# Patient Record
(demographics unavailable — no encounter records)

---
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
## 2024-12-02 NOTE — HISTORY OF PRESENT ILLNESS
[FreeTextEntry1] :  76 year old F with PMHx of HLD presents to establish cardiac care.   Reports myalgias with multiple statins. Recently tried rosuvastatin 10 daily and every other day but continued to myalgias. Started Zetia one week ago without concerns In June, stopped drinking alcohol. Previously had 2-4 drinks per day Swims 30 minutes daily, indoor and outdoor biking 45 minutes 2-3 times a week. Able to walk/ or run upstairs. Mild dyspnea on exertion on occasion She reports an episode of chest tightness that lasted for 1 week. The pain was brought on with lying down. Denies onset or worsening of symptoms with exertion She eats a healthy diet, no meat, mostly home cooked   Pt. denies any  orthopnea, PND, palpitations, lightheadedness, syncope or lower extremity edema. ================================ Previous workup: Labs (11/25/2024):  HDL 75  K 4 Cr 0.80 Labs (6/2022):  HDL 99  K 4.3 Cr 0.90 A1c 5.2%

## 2025-04-09 NOTE — HISTORY OF PRESENT ILLNESS
[FreeTextEntry1] : Dear Dr. Alexandre (Internal medicine)  Thank you so much for the referral to help care for your patient.  Chief Complaint: enlarged kidney not draining kidney pain. Date of first visit: 04/10/2025   WILFRED FAITH is a 77  year old ~race man ~ who presents for enlarged kidney not draining kidney pain.  CT  Hx: CT Chest           03/28/2025- dilation the ascending aorta measuring 3.8 cm. Hydronephrosis of the right kidney.  MRI Hx   Biopsy Hx
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
## 2025-04-10 NOTE — PHYSICAL EXAM
[General Appearance - Well Developed] : well developed [General Appearance - Well Nourished] : well nourished [Normal Appearance] : normal appearance [Well Groomed] : well groomed [General Appearance - In No Acute Distress] : no acute distress [] : no respiratory distress [Respiration, Rhythm And Depth] : normal respiratory rhythm and effort [Exaggerated Use Of Accessory Muscles For Inspiration] : no accessory muscle use [Normal Station and Gait] : the gait and station were normal for the patient's age [FreeTextEntry1] : right upper back pain with pain on palpitation  [No Focal Deficits] : no focal deficits [Oriented To Time, Place, And Person] : oriented to person, place, and time [Mood] : the mood was normal [Affect] : the affect was normal [Not Anxious] : not anxious

## 2025-04-10 NOTE — ASSESSMENT
[FreeTextEntry1] : Diagnosis: Right Hydronephrosis  Plan: CT a/p reviewed NM renal lasix scan  Encourage follow up with Dr. Alexandre for chest/ upper back discomfort  RTC after NM renal scan.    Saud Huggins MD, FACS, FRCS  of Urology [facility] Director of Laparoscopic and Robotic Surgery [facility] Director of Urology, [facility] Professor of Urology (Office) [phone] (Cell) [phone] [email or web address]   I performed history/review of imaging, discussed treatment plan with patient, agree with above transcription by HARLEY.

## 2025-04-15 NOTE — DISCUSSION/SUMMARY
[FreeTextEntry1] : EKG:NSR enforced need for pulmonary toilet and use incentive spirometer diet + zetia for HLD and try nexletol feel cp is musculoskeletal

## 2025-04-15 NOTE — PHYSICAL EXAM
[Well Developed] : well developed [Well Nourished] : well nourished [No Acute Distress] : no acute distress [Normal Conjunctiva] : normal conjunctiva [Normal Venous Pressure] : normal venous pressure [No Carotid Bruit] : no carotid bruit [Normal S1, S2] : normal S1, S2 [No Murmur] : no murmur [No Rub] : no rub [No Gallop] : no gallop [Clear Lung Fields] : clear lung fields [Good Air Entry] : good air entry [No Respiratory Distress] : no respiratory distress  [Soft] : abdomen soft [Non Tender] : non-tender [Normal Bowel Sounds] : normal bowel sounds [Normal Gait] : normal gait [No Edema] : no edema [No Cyanosis] : no cyanosis [No Clubbing] : no clubbing [No Rash] : no rash [Moves all extremities] : moves all extremities [Normal Speech] : normal speech [Alert and Oriented] : alert and oriented [de-identified] : + chesamuel tendernes
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
